# Patient Record
(demographics unavailable — no encounter records)

---

## 2019-01-27 NOTE — EDM.PDOC
Scribed by Verona Miller 01/27/19 [id number] for Vee Bermudez NP





ED HPI GENERAL MEDICAL PROBLEM





- General


Chief Complaint: Gastrointestinal Problem


Stated Complaint: SPITTING UP BLOOD


Time Seen by Provider: 01/27/19 16:50


Source of Information: Reports: Patient, RN, RN Notes Reviewed


History Limitations: Reports: No Limitations





- History of Present Illness


INITIAL COMMENTS - FREE TEXT/NARRATIVE: 


Patient 45 minutes ago sat up and spit blood. No vomiting. Unsure about amount 

of blood. No abdominal pain. No chest pain. No nausea. She has had dizziness 

when she gets up or moves her head. Diagnosed with vertigo. Has been having 

shortness of breath at baseline but worsened a couple of months. She smokes a 1-

2 cigarette a day for 30 years. She has been using alcohol heavily since 

daughter's death on October 27, 2018. She drinks 1 pint every 2 to 3 days. 

History of end stage liver disease secondary to alcoholic cirrhosis. Patient 

had TIPS procedure done in 2010 and later revised in 2018. She also has 

historey of varicele bleed from [facility].   She takes Aleve 3 tabs once a 

day for fibromyalgia for months as a blood thinner. 


Onset: Today


Duration: Getting Worse


Location: Reports: Abdomen


Severity: Moderate


Improves with: Reports: None


Worsens with: Reports: None


Associated Symptoms: Reports: No Other Symptoms





- Related Data


 Allergies











Allergy/AdvReac Type Severity Reaction Status Date / Time


 


duloxetine HCl Allergy  Facial Verified 01/27/19 17:58





[From Cymbalta]   Swelling  


 


Penicillins Allergy  Facial Verified 01/27/19 17:58





   Swelling  











Home Meds: 


 Home Meds





Folic Acid 0.4 mg PO DAILY 07/26/14 [History]


Formoterol/Mometasone [Dulera 100 MCG/5 MCG] 2 inhalation PO BID 07/26/14 [

History]


Gabapentin [Neurontin] 600 mg PO TID 07/26/14 [History]


Levalbuterol HCl [Xopenex] 1 ampule NEB Q6H PRN 07/26/14 [History]


Loratadine [Claritin] 10 mg PO DAILY 07/26/14 [History]


Zolpidem [Ambien] 5 mg PO BEDTIME PRN 07/26/14 [History]


Formoterol/Mometasone [Dulera 100-50 MCG] 2 puff IH BID 03/31/15 [History]


Levalbuterol HCl [Xopenex] 1 ampule NEB Q4HR PRN 03/31/15 [History]


Rifaximin [Xifaxan] 550 mg PO BID 03/31/15 [History]


oxyCODONE 5 mg PO BID PRN 03/31/15 [History]











ED ROS GENERAL





- Review of Systems


Review Of Systems: ROS reveals no pertinent complaints other than HPI.





ED EXAM, GENERAL





- Physical Exam


Exam: See Below


Exam Limited By: No Limitations


General Appearance: Alert, WD/WN, No Apparent Distress


Eye Exam: Bilateral Eye: EOMI, Normal Inspection, PERRL


Ears: Normal External Exam, Normal Canal, Hearing Grossly Normal, Normal TMs


Nose: Normal Inspection, Normal Mucosa, No Blood


Throat/Mouth: Normal Inspection, Normal Lips, Normal Teeth, Normal Gums, Normal 

Oropharynx, Normal Voice, No Airway Compromise


Head: Atraumatic, Normocephalic


Neck: Normal Inspection, Supple, Non-Tender, Full Range of Motion


Respiratory/Chest: No Respiratory Distress, Lungs Clear, Normal Breath Sounds, 

No Accessory Muscle Use, Chest Non-Tender


Cardiovascular: Normal Peripheral Pulses, Regular Rate, Rhythm, No Edema, No 

Gallop, No JVD, No Murmur, No Rub


GI/Abdominal: Normal Bowel Sounds, Soft, Non-Tender, No Organomegaly, No 

Distention, No Abnormal Bruit, No Mass


 (Female) Exam: Deferred


Rectal (Female) Exam: Deferred


Back Exam: Normal Inspection, Full Range of Motion, NT


Extremities: Normal Inspection, Normal Range of Motion, Non-Tender, Normal 

Capillary Refill, No Pedal Edema


Neurological: Alert, Oriented, CN II-XII Intact, Normal Cognition, Normal Gait, 

Normal Reflexes, No Motor/Sensory Deficits


Psychiatric: Normal Affect, Normal Mood


Skin Exam: Warm, Dry, Intact, Normal Color, No Rash


Lymphatic: No Adenopathy





Course





- Vital Signs


Last Recorded V/S: 


 Last Vital Signs











Temp  98.3 F   01/27/19 18:30


 


Pulse  85   01/27/19 18:30


 


Resp  18   01/27/19 18:30


 


BP  142/78 H  01/27/19 18:30


 


Pulse Ox  98   01/27/19 18:30














- Orders/Labs/Meds


Labs: 


 Laboratory Tests











  01/27/19 01/27/19 01/27/19 Range/Units





  17:13 17:13 17:13 


 


WBC  6.4    (5.0-10.0)  10^3/uL


 


RBC  4.05 L    (4.2-5.4)  10^6/uL


 


Hgb  15.3    (12.0-16.0)  g/dL


 


Hct  42.4    (37.0-47.0)  %


 


MCV  104.7 H D    ()  fL


 


MCH  37.8 H    (27.0-34.0)  pg


 


MCHC  36.1 H    (33.0-35.0)  g/dL


 


Plt Count  119 L    (150-450)  10^3/uL


 


Neut % (Auto)  65.5    (42.2-75.2)  %


 


Lymph % (Auto)  22.6    (20.5-50.1)  %


 


Mono % (Auto)  10.4 H    (2-8)  %


 


Eos % (Auto)  1.2    (1.0-3.0)  %


 


Baso % (Auto)  0.3    (0.0-1.0)  %


 


Add Manual Diff  Yes    


 


Neutrophils % (Manual)  68    (42-75)  %


 


Lymphocytes % (Manual)  27    (20-50)  %


 


Monocytes % (Manual)  3    (2-8)  %


 


Eosinophils % (Manual)  2    (1-3)  %


 


PT   10.4   (9.0-12.0)  SEC


 


INR   1.0   (0.9-1.2)  


 


Sodium    138  (135-145)  mmol/L


 


Potassium    3.5 L  (3.6-5.0)  mmol/L


 


Chloride    104  (101-111)  mmol/L


 


Carbon Dioxide    25.0  (21.0-31.0)  mmol/L


 


Anion Gap    12.5  


 


BUN    9  (7-18)  mg/dL


 


Creatinine    0.5 L  (0.6-1.3)  mg/dL


 


Est Cr Clr Drug Dosing    95.57  mL/min


 


Estimated GFR (MDRD)    > 60  


 


BUN/Creatinine Ratio    18.00  


 


Glucose    97  ()  mg/dL


 


Calcium    8.2 L  (8.4-10.2)  mg/dl


 


Total Bilirubin    2.9 H  (0.2-1.0)  mg/dL


 


AST    63 H  (10-42)  IU/L


 


ALT    25  (10-60)  IU/L


 


Alkaline Phosphatase    112  ()  IU/L


 


Total Protein    6.6 L  (6.7-8.2)  g/dl


 


Albumin    3.0 L  (3.2-5.5)  g/dl


 


Globulin    3.6  


 


Albumin/Globulin Ratio    0.83  


 


Ethyl Alcohol     mg/dL














  01/27/19 Range/Units





  17:13 


 


WBC   (5.0-10.0)  10^3/uL


 


RBC   (4.2-5.4)  10^6/uL


 


Hgb   (12.0-16.0)  g/dL


 


Hct   (37.0-47.0)  %


 


MCV   ()  fL


 


MCH   (27.0-34.0)  pg


 


MCHC   (33.0-35.0)  g/dL


 


Plt Count   (150-450)  10^3/uL


 


Neut % (Auto)   (42.2-75.2)  %


 


Lymph % (Auto)   (20.5-50.1)  %


 


Mono % (Auto)   (2-8)  %


 


Eos % (Auto)   (1.0-3.0)  %


 


Baso % (Auto)   (0.0-1.0)  %


 


Add Manual Diff   


 


Neutrophils % (Manual)   (42-75)  %


 


Lymphocytes % (Manual)   (20-50)  %


 


Monocytes % (Manual)   (2-8)  %


 


Eosinophils % (Manual)   (1-3)  %


 


PT   (9.0-12.0)  SEC


 


INR   (0.9-1.2)  


 


Sodium   (135-145)  mmol/L


 


Potassium   (3.6-5.0)  mmol/L


 


Chloride   (101-111)  mmol/L


 


Carbon Dioxide   (21.0-31.0)  mmol/L


 


Anion Gap   


 


BUN   (7-18)  mg/dL


 


Creatinine   (0.6-1.3)  mg/dL


 


Est Cr Clr Drug Dosing   mL/min


 


Estimated GFR (MDRD)   


 


BUN/Creatinine Ratio   


 


Glucose   ()  mg/dL


 


Calcium   (8.4-10.2)  mg/dl


 


Total Bilirubin   (0.2-1.0)  mg/dL


 


AST   (10-42)  IU/L


 


ALT   (10-60)  IU/L


 


Alkaline Phosphatase   ()  IU/L


 


Total Protein   (6.7-8.2)  g/dl


 


Albumin   (3.2-5.5)  g/dl


 


Globulin   


 


Albumin/Globulin Ratio   


 


Ethyl Alcohol  < 5  mg/dL














Departure





- Departure


Time of Disposition: 18:33


Disposition: DC/[facility]fer to [facility]


Condition: Fair


Clinical Impression: 


 End stage liver disease, Alcoholism /alcohol abuse





Esophageal varices


Qualifiers:


 Esophageal varices type: unspecified type Esophageal varices bleeding: with 

bleeding Qualified Code(s): I85.01 - Esophageal varices with bleeding





GI bleed


Qualifiers:


 GI bleed type/associated pathology: unspecified gastrointestinal hemorrhage 

type Qualified Code(s): K92.2 - Gastrointestinal hemorrhage, unspecified








- Discharge Information


*PRESCRIPTION DRUG MONITORING PROGRAM REVIEWED*: No


*COPY OF PRESCRIPTION DRUG MONITORING REPORT IN PATIENT PETE: No


Forms:  ED Department Discharge, Interfacility Transfer EMTALA





I have read and agree with the documentation that has been completed regarding 

this visit. By signing this record, I attest that the documentation was 

completed in my physical presence and is an accurate record of the encounter.